# Patient Record
Sex: MALE | Race: WHITE
[De-identification: names, ages, dates, MRNs, and addresses within clinical notes are randomized per-mention and may not be internally consistent; named-entity substitution may affect disease eponyms.]

---

## 2020-07-20 LAB
ALBUMIN SERPL-MCNC: 4.6 G/DL (ref 3.5–5)
ALBUMIN/GLOB SERPL: 1.7 {RATIO} (ref 0.8–2)
ALP SERPL-CCNC: 53 IU/L (ref 40–150)
ALT SERPL-CCNC: 21 IU/L (ref 0–55)
ANION GAP SERPL CALC-SCNC: 11.6 MMOL/L (ref 8–16)
BASOPHILS # BLD AUTO: 0 10*3/UL (ref 0–0.1)
BASOPHILS NFR BLD AUTO: 0.4 % (ref 0–1)
BUN SERPL-MCNC: 20 MG/DL (ref 7–26)
BUN/CREAT SERPL: 16 (ref 6–25)
CALCIUM SERPL-MCNC: 10 MG/DL (ref 8.4–10.2)
CHLORIDE SERPL-SCNC: 106 MMOL/L (ref 98–107)
CO2 SERPL-SCNC: 28 MMOL/L (ref 22–29)
DEPRECATED NEUTROPHILS # BLD AUTO: 4.7 10*3/UL (ref 2.1–6.9)
EGFRCR SERPLBLD CKD-EPI 2021: 56 ML/MIN (ref 60–?)
EOSINOPHIL # BLD AUTO: 0.2 10*3/UL (ref 0–0.4)
EOSINOPHIL NFR BLD AUTO: 2.9 % (ref 0–6)
ERYTHROCYTE [DISTWIDTH] IN CORD BLOOD: 12.1 % (ref 11.7–14.4)
GLOBULIN PLAS-MCNC: 2.7 G/DL (ref 2.3–3.5)
GLUCOSE SERPLBLD-MCNC: 119 MG/DL (ref 74–118)
HCT VFR BLD AUTO: 45.5 % (ref 38.2–49.6)
HGB BLD-MCNC: 14.8 G/DL (ref 14–18)
LYMPHOCYTES # BLD: 1.5 10*3/UL (ref 1–3.2)
LYMPHOCYTES NFR BLD AUTO: 21.8 % (ref 18–39.1)
MCH RBC QN AUTO: 30.3 PG (ref 28–32)
MCHC RBC AUTO-ENTMCNC: 32.5 G/DL (ref 31–35)
MCV RBC AUTO: 93 FL (ref 81–99)
MONOCYTES # BLD AUTO: 0.6 10*3/UL (ref 0.2–0.8)
MONOCYTES NFR BLD AUTO: 7.9 % (ref 4.4–11.3)
NEUTS SEG NFR BLD AUTO: 66.7 % (ref 38.7–80)
PLATELET # BLD AUTO: 210 X10E3/UL (ref 140–360)
POTASSIUM SERPL-SCNC: 4.6 MMOL/L (ref 3.5–5.1)
RBC # BLD AUTO: 4.89 X10E6/UL (ref 4.3–5.7)
SODIUM SERPL-SCNC: 141 MMOL/L (ref 136–145)

## 2020-07-21 NOTE — NUR
2737p Phone interview completed.Pt denies any symptoms of CoviD aware of self  quarantine 
till procedural date. To arrive at 1100AM on 24th July. Covid results still pending.Wife 
Deja wife will occupy pt as  and will stay with pt in recovery. ds/rn

## 2020-07-24 ENCOUNTER — HOSPITAL ENCOUNTER (OUTPATIENT)
Dept: HOSPITAL 88 - CATH LAB | Age: 75
Discharge: HOME | End: 2020-07-24
Attending: INTERNAL MEDICINE
Payer: MEDICARE

## 2020-07-24 VITALS — SYSTOLIC BLOOD PRESSURE: 102 MMHG | DIASTOLIC BLOOD PRESSURE: 66 MMHG

## 2020-07-24 VITALS — BODY MASS INDEX: 30.49 KG/M2 | HEIGHT: 70 IN | WEIGHT: 213 LBS

## 2020-07-24 VITALS — DIASTOLIC BLOOD PRESSURE: 68 MMHG | SYSTOLIC BLOOD PRESSURE: 124 MMHG

## 2020-07-24 VITALS — DIASTOLIC BLOOD PRESSURE: 70 MMHG | SYSTOLIC BLOOD PRESSURE: 110 MMHG

## 2020-07-24 VITALS — DIASTOLIC BLOOD PRESSURE: 70 MMHG | SYSTOLIC BLOOD PRESSURE: 118 MMHG

## 2020-07-24 VITALS — SYSTOLIC BLOOD PRESSURE: 116 MMHG | DIASTOLIC BLOOD PRESSURE: 71 MMHG

## 2020-07-24 VITALS — SYSTOLIC BLOOD PRESSURE: 125 MMHG | DIASTOLIC BLOOD PRESSURE: 77 MMHG

## 2020-07-24 VITALS — DIASTOLIC BLOOD PRESSURE: 77 MMHG | SYSTOLIC BLOOD PRESSURE: 108 MMHG

## 2020-07-24 VITALS — DIASTOLIC BLOOD PRESSURE: 77 MMHG | SYSTOLIC BLOOD PRESSURE: 118 MMHG

## 2020-07-24 VITALS — DIASTOLIC BLOOD PRESSURE: 77 MMHG | SYSTOLIC BLOOD PRESSURE: 167 MMHG

## 2020-07-24 VITALS — DIASTOLIC BLOOD PRESSURE: 57 MMHG | SYSTOLIC BLOOD PRESSURE: 111 MMHG

## 2020-07-24 VITALS — DIASTOLIC BLOOD PRESSURE: 78 MMHG | SYSTOLIC BLOOD PRESSURE: 102 MMHG

## 2020-07-24 VITALS — SYSTOLIC BLOOD PRESSURE: 108 MMHG | DIASTOLIC BLOOD PRESSURE: 74 MMHG

## 2020-07-24 VITALS — DIASTOLIC BLOOD PRESSURE: 77 MMHG | SYSTOLIC BLOOD PRESSURE: 146 MMHG

## 2020-07-24 DIAGNOSIS — I10: ICD-10-CM

## 2020-07-24 DIAGNOSIS — I83.93: ICD-10-CM

## 2020-07-24 DIAGNOSIS — E78.5: ICD-10-CM

## 2020-07-24 DIAGNOSIS — I20.8: Primary | ICD-10-CM

## 2020-07-24 DIAGNOSIS — Z79.01: ICD-10-CM

## 2020-07-24 DIAGNOSIS — Z11.59: ICD-10-CM

## 2020-07-24 DIAGNOSIS — I48.0: ICD-10-CM

## 2020-07-24 PROCEDURE — 80053 COMPREHEN METABOLIC PANEL: CPT

## 2020-07-24 PROCEDURE — 99152 MOD SED SAME PHYS/QHP 5/>YRS: CPT

## 2020-07-24 PROCEDURE — 99153 MOD SED SAME PHYS/QHP EA: CPT

## 2020-07-24 PROCEDURE — 85025 COMPLETE CBC W/AUTO DIFF WBC: CPT

## 2020-07-24 PROCEDURE — 36415 COLL VENOUS BLD VENIPUNCTURE: CPT

## 2020-07-24 PROCEDURE — 93454 CORONARY ARTERY ANGIO S&I: CPT

## 2020-07-24 PROCEDURE — 92928 PRQ TCAT PLMT NTRAC ST 1 LES: CPT

## 2020-07-24 PROCEDURE — 76937 US GUIDE VASCULAR ACCESS: CPT

## 2020-07-24 NOTE — NUR
1120a in CCL#9,Identiferx2, prepped for procedure. Alert oriented and appropriate, PERRLA, 
respirations even and unlabored to room air. Pulses x4 extremities equal . Pedal pulses 
PT/DP present/palpable and marked. Cap fill brisk < 3 sec. bilateral feet semi cool and 
pale. LHC for abnormal Stress Test.



Skin warm and dry integrity appears intact in general. IV left hand #20 x1,started and  
presents healthy w/o s/s of infiltration or complaint. NS 0.9% started at 100ml/hr per dial 
flow. Abdomen soft and supple. pt offered toileting, denies need to urinate or defecate. 
Personal affects with patient.  Family at bedside.Wife Deja. Pt and family verbalizes 
understanding of POC.Prepped and ready. Stable vs No Cp or SOB. Handoff to Mike RN Denies 
c/o ds/rn

## 2020-07-24 NOTE — OPERATIVE REPORT
DATE OF PROCEDURE:  07/24/2020

 

SURGEON:  Jef Pitts MD

 

INDICATIONS:  Coronary artery disease, angina, and abnormal stress test.

 

PROCEDURES PERFORMED:  

1. Left heart catheterization, selective coronary angiography.

2. Ultrasound-guided access in the right radial artery.

3. Deployment of right wrist TR band.

4. Conscious sedation, 65 minutes.

5. Stent placement to the mid right coronary artery.

 

COMPLICATIONS:  None.

 

BLOOD LOSS:  Minimal.

 

RECOMMENDATIONS:  Staged intervention of the left anterior descending artery.

 

DESCRIPTION OF PROCEDURE:  Access obtained in the right radial artery.  Using ultrasound

guidance, a 6-Montenegrin sheath was placed.  The patient received intravenous Angiomax, oral

prasugrel, and aspirin for anticoagulation.  Left main, no disease.  Left anterior

descending artery, mid 70% stenosis.  Circumflex, mild disease.  Right coronary artery,

mid 90%, distal 50%.  LV end-diastolic pressure was normal. 

 

A decision was made to intervene on the right coronary artery.  The right coronary

artery is cannulated using a JR4 6-Montenegrin guiding catheter.  Short Runthrough wire was

advanced for support.  Primary stent 2.75 x 16 mm Synergy stent deployed at 18

atmospheres, excellent end result, less than 10% residual stenosis, EDUIN 3 flow.  No

complications.  Wire and guide sheath removed.  TR band applied.  The patient discharged

home the same day. 

 

 

 

 

______________________________

Jef Pitts MD

 

[facility]

D:  07/24/2020 15:55:33

T:  07/24/2020 18:37:39

Job #:  [id number]

## 2020-07-24 NOTE — NUR
1800p Pt meets DC criteria. Rt arm assessed for s/s of complication and presence of 
hematoma. Skin warm, dry, no discolor, and pulses present. IV removed from left hand. Distal 
tip appears intact. VS WNL. Pt denies pain, sob, or need at this time. Family wife at 
bedside. Review of discharge paperwork and follow up instructions. verbalized understanding. 
Pt to wheelchair and transported to front of hospital. Transferred to private vehicle under 
own strength w/o incident with DC paperwork in hand. - ds/rn

## 2020-07-24 NOTE — NUR
1345pm RECEIVING NOTE CATH LAB RECOVERY 
DEPT...............................................................





Bedside report received from Gustavo DE LA GARZA. Identifierx2. Alert oriented and appropriate, PERRLA, 
respirations even and unlabored to room air. Pulses x4 extremities equal and strong. Pedal 
pulses PT/DP X4 and marked. Cap fill brisk < 3 sec. Rt Tr band intact NO gross issues pain 
pallor pressure or dysrhythmia.



Skin warm and dry integrity appears D/I. IV 20g to left hand at 100cchr via controller,  
presents healthy w/o s/s of infiltration or complaint. Abdomen soft and supple. pt offered 
toileting, denies need to urinate or defecate. No personal affects with patient.  Family 
wife at bedside. Pt and family verbalizes understanding of POC. TR band down at 1630 and dc 
home at 1800pm with wife occupance.



Currently w/o complaint of pain or need. ds/rn

## 2020-07-24 NOTE — NUR
1140a preop given pt aware to ask for assistance bed in low position,call light at bs wife 
at bedside ds/rn

## 2024-11-18 ENCOUNTER — HOSPITAL ENCOUNTER (EMERGENCY)
Dept: HOSPITAL 88 - ER | Age: 79
Discharge: HOME | End: 2024-11-18
Payer: MEDICARE

## 2024-11-18 VITALS — OXYGEN SATURATION: 99 % | HEART RATE: 95 BPM | TEMPERATURE: 97.5 F

## 2024-11-18 VITALS — HEIGHT: 70 IN | WEIGHT: 213 LBS | BODY MASS INDEX: 30.49 KG/M2

## 2024-11-18 DIAGNOSIS — E78.00: ICD-10-CM

## 2024-11-18 DIAGNOSIS — M51.16: Primary | ICD-10-CM

## 2024-11-18 PROCEDURE — 99283 EMERGENCY DEPT VISIT LOW MDM: CPT

## 2024-11-18 PROCEDURE — 72131 CT LUMBAR SPINE W/O DYE: CPT

## 2024-11-18 RX ADMIN — CYCLOBENZAPRINE HYDROCHLORIDE ONE MG: 10 TABLET, FILM COATED ORAL at 08:11

## 2024-11-18 RX ADMIN — Medication STA MG: at 08:11

## 2024-12-10 LAB
ANION GAP SERPL CALC-SCNC: 17 MMOL/L (ref 8–16)
BASOPHILS # BLD AUTO: 0 10*3/UL (ref 0–0.1)
BASOPHILS NFR BLD AUTO: 0.4 % (ref 0–1)
BUN SERPL-MCNC: 17 MG/DL (ref 7–26)
BUN/CREAT SERPL: 14 (ref 6–25)
CALCIUM SERPL-MCNC: 10.3 MG/DL (ref 8.4–10.2)
CHLORIDE SERPL-SCNC: 100 MMOL/L (ref 98–107)
CO2 SERPL-SCNC: 24 MMOL/L (ref 22–29)
DEPRECATED APTT PLAS QN: 45.8 SECONDS (ref 23.8–35.5)
DEPRECATED INR PLAS: 1.39
DEPRECATED NEUTROPHILS # BLD AUTO: 5.3 10*3/UL (ref 2.1–6.9)
EGFRCR SERPLBLD CKD-EPI 2021: 60 ML/MIN (ref 60–?)
EOSINOPHIL # BLD AUTO: 0.3 10*3/UL (ref 0–0.4)
EOSINOPHIL NFR BLD AUTO: 3.8 % (ref 0–6)
ERYTHROCYTE [DISTWIDTH] IN CORD BLOOD: 12 % (ref 11.7–14.4)
GLUCOSE SERPLBLD-MCNC: 290 MG/DL (ref 74–118)
HCT VFR BLD AUTO: 44.1 % (ref 38.2–49.6)
HGB BLD-MCNC: 14.1 G/DL (ref 14–18)
LYMPHOCYTES # BLD: 1.6 10*3/UL (ref 1–3.2)
LYMPHOCYTES NFR BLD AUTO: 20.6 % (ref 18–39.1)
MCH RBC QN AUTO: 31 PG (ref 28–32)
MCHC RBC AUTO-ENTMCNC: 32 G/DL (ref 31–35)
MCV RBC AUTO: 96.9 FL (ref 81–99)
MONOCYTES # BLD AUTO: 0.6 10*3/UL (ref 0.2–0.8)
MONOCYTES NFR BLD AUTO: 7.2 % (ref 4.4–11.3)
NEUTS SEG NFR BLD AUTO: 67.7 % (ref 38.7–80)
PLATELET # BLD AUTO: 216 X10E3/UL (ref 140–360)
POTASSIUM SERPL-SCNC: 5 MMOL/L (ref 3.5–5.1)
PROTHROMBIN TIME: 17.8 SECONDS (ref 11.9–14.5)
RBC # BLD AUTO: 4.55 X10E6/UL (ref 4.3–5.7)
SODIUM SERPL-SCNC: 136 MMOL/L (ref 136–145)
WBC # BLD: 7.83 X10E3/UL (ref 4.8–10.8)

## 2024-12-11 LAB
DEPRECATED APTT PLAS QN: 26.9 SECONDS (ref 23.8–35.5)
DEPRECATED INR PLAS: 0.98
PROTHROMBIN TIME: 13.6 SECONDS (ref 11.9–14.5)

## 2024-12-12 ENCOUNTER — HOSPITAL ENCOUNTER (OUTPATIENT)
Dept: HOSPITAL 88 - OR | Age: 79
Setting detail: OBSERVATION
LOS: 1 days | Discharge: HOME | End: 2024-12-13
Attending: NEUROLOGICAL SURGERY | Admitting: NEUROLOGICAL SURGERY
Payer: MEDICARE

## 2024-12-12 VITALS — WEIGHT: 215 LBS | HEIGHT: 70 IN | BODY MASS INDEX: 30.78 KG/M2

## 2024-12-12 VITALS
TEMPERATURE: 98.2 F | RESPIRATION RATE: 19 BRPM | SYSTOLIC BLOOD PRESSURE: 153 MMHG | HEART RATE: 108 BPM | DIASTOLIC BLOOD PRESSURE: 86 MMHG | OXYGEN SATURATION: 96 %

## 2024-12-12 VITALS
RESPIRATION RATE: 18 BRPM | OXYGEN SATURATION: 97 % | SYSTOLIC BLOOD PRESSURE: 152 MMHG | TEMPERATURE: 98.2 F | HEART RATE: 88 BPM | DIASTOLIC BLOOD PRESSURE: 83 MMHG

## 2024-12-12 DIAGNOSIS — M48.062: Primary | ICD-10-CM

## 2024-12-12 DIAGNOSIS — Z79.84: ICD-10-CM

## 2024-12-12 DIAGNOSIS — E03.9: ICD-10-CM

## 2024-12-12 DIAGNOSIS — Z79.899: ICD-10-CM

## 2024-12-12 DIAGNOSIS — Z01.818: ICD-10-CM

## 2024-12-12 DIAGNOSIS — I25.10: ICD-10-CM

## 2024-12-12 DIAGNOSIS — E78.5: ICD-10-CM

## 2024-12-12 DIAGNOSIS — R73.03: ICD-10-CM

## 2024-12-12 DIAGNOSIS — I10: ICD-10-CM

## 2024-12-12 DIAGNOSIS — I48.91: ICD-10-CM

## 2024-12-12 DIAGNOSIS — Z01.812: ICD-10-CM

## 2024-12-12 DIAGNOSIS — Z79.82: ICD-10-CM

## 2024-12-12 DIAGNOSIS — Z01.810: ICD-10-CM

## 2024-12-12 DIAGNOSIS — Z95.5: ICD-10-CM

## 2024-12-12 PROCEDURE — 85610 PROTHROMBIN TIME: CPT

## 2024-12-12 PROCEDURE — 71046 X-RAY EXAM CHEST 2 VIEWS: CPT

## 2024-12-12 PROCEDURE — 93005 ELECTROCARDIOGRAM TRACING: CPT

## 2024-12-12 PROCEDURE — 88304 TISSUE EXAM BY PATHOLOGIST: CPT

## 2024-12-12 PROCEDURE — 82947 ASSAY GLUCOSE BLOOD QUANT: CPT

## 2024-12-12 PROCEDURE — 63048 LAM FACETEC &FORAMOT EA ADDL: CPT

## 2024-12-12 PROCEDURE — 72020 X-RAY EXAM OF SPINE 1 VIEW: CPT

## 2024-12-12 PROCEDURE — 97161 PT EVAL LOW COMPLEX 20 MIN: CPT

## 2024-12-12 PROCEDURE — 88311 DECALCIFY TISSUE: CPT

## 2024-12-12 PROCEDURE — 86900 BLOOD TYPING SEROLOGIC ABO: CPT

## 2024-12-12 PROCEDURE — 85730 THROMBOPLASTIN TIME PARTIAL: CPT

## 2024-12-12 PROCEDURE — 36415 COLL VENOUS BLD VENIPUNCTURE: CPT

## 2024-12-12 PROCEDURE — 86850 RBC ANTIBODY SCREEN: CPT

## 2024-12-12 PROCEDURE — 85025 COMPLETE CBC W/AUTO DIFF WBC: CPT

## 2024-12-12 PROCEDURE — 80048 BASIC METABOLIC PNL TOTAL CA: CPT

## 2024-12-12 PROCEDURE — 63047 LAM FACETEC & FORAMOT LUMBAR: CPT

## 2024-12-12 RX ADMIN — SODIUM CHLORIDE, POTASSIUM CHLORIDE, SODIUM LACTATE AND CALCIUM CHLORIDE ONE ML/HR: 600; 310; 30; 20 INJECTION, SOLUTION INTRAVENOUS at 06:08

## 2024-12-12 RX ADMIN — METFORMIN HYDROCHLORIDE SCH MG: 500 TABLET, FILM COATED ORAL at 16:07

## 2024-12-12 RX ADMIN — FENTANYL CITRATE ONE MCG: 50 INJECTION INTRAMUSCULAR; INTRAVENOUS at 10:00

## 2024-12-12 RX ADMIN — HYDROMORPHONE HYDROCHLORIDE ONE MG: 1 INJECTION, SOLUTION INTRAMUSCULAR; INTRAVENOUS; SUBCUTANEOUS at 10:11

## 2024-12-12 RX ADMIN — Medication SCH MG: at 16:07

## 2024-12-12 RX ADMIN — SODIUM CHLORIDE, POTASSIUM CHLORIDE, SODIUM LACTATE AND CALCIUM CHLORIDE SCH MLS/HR: 600; 310; 30; 20 INJECTION, SOLUTION INTRAVENOUS at 11:52

## 2024-12-12 RX ADMIN — ZOLPIDEM TARTRATE PRN MG: 5 TABLET, FILM COATED ORAL at 23:42

## 2024-12-12 RX ADMIN — Medication SCH MG: at 20:50

## 2024-12-12 RX ADMIN — CYCLOBENZAPRINE HYDROCHLORIDE PRN MG: 10 TABLET, FILM COATED ORAL at 20:55

## 2024-12-12 RX ADMIN — SODIUM CHLORIDE ONE: 9 INJECTION, SOLUTION INTRAVENOUS at 06:08

## 2024-12-12 RX ADMIN — OXYCODONE HYDROCHLORIDE AND ACETAMINOPHEN PRN EACH: 5; 325 TABLET ORAL at 11:52

## 2024-12-13 VITALS
SYSTOLIC BLOOD PRESSURE: 146 MMHG | HEART RATE: 101 BPM | RESPIRATION RATE: 17 BRPM | OXYGEN SATURATION: 98 % | DIASTOLIC BLOOD PRESSURE: 77 MMHG | TEMPERATURE: 97.6 F

## 2024-12-13 VITALS — HEART RATE: 101 BPM | DIASTOLIC BLOOD PRESSURE: 77 MMHG | SYSTOLIC BLOOD PRESSURE: 146 MMHG

## 2024-12-13 RX ADMIN — LEVOTHYROXINE SODIUM SCH MCG: 25 TABLET ORAL at 06:11

## 2024-12-13 RX ADMIN — METOPROLOL SUCCINATE SCH MG: 25 TABLET, EXTENDED RELEASE ORAL at 09:17
